# Patient Record
(demographics unavailable — no encounter records)

---

## 2024-11-25 NOTE — HISTORY OF PRESENT ILLNESS
[Menopause Age: ____] : age at menopause was [unfilled] [TextBox_4] :  She is .  Menopausal. No children d/t infertility. Her  is disabled- Meniere's disease. He is 9 years older than her. Feels well. No gyn complaints. Normal bladder & bowel function. Pt;s life is stressful - she has been her parents caretaker / nurse. M- advance Alzheimers / bedridden.  F  of CHF in 2024 Laparoscopic RSO - for a benign cyst.  has depression and can be verbally abusive.  Patient worked at [employer]. but went on disability for anxiety/stress. Pt has been caring for both of her parents - M has dementia; F - severe degenerative joint disease; CHF. Pt's sister is in [facility] hospital incapable of caring for herself. On meds for anxiety/ depression- taking Citalopram and Xanax; manages with her aretha and therapist/ psychiatrist. Stopped working in .  -hysteroscopy. Hx of IBS - PCP; no GI MD. Normal bladder function. .  2017-right salpingo-oophorectomy. Pt had had Covid vaccine incl the bivalent booster. Also had Covid in early 10/8/22. [Mammogramdate] : 10/25/2024 [TextBox_19] : Grant lifetime risk: 11.7%.  Scattered fibroglandular density with no evidence of malignancy.  Breast arterial calcification: Grade 0.  10/25/2024 [BreastSonogramDate] : 10/25/2024 [TextBox_25] : Negative [PapSmeardate] : 11/21/2023 [TextBox_31] : neg; HPV neg [TextBox_43] : Cologuanam neg in 2023 - thru PCP

## 2024-11-25 NOTE — PHYSICAL EXAM
[Chaperone Present] : A chaperone was present in the examining room during all aspects of the physical examination [No Lymphadenopathy] : no lymphadenopathy [Soft] : soft [Non-tender] : non-tender [No HSM] : No HSM [No Mass] : no mass [Examination Of The Breasts] : a normal appearance [No Masses] : no breast masses were palpable [Vulvar Atrophy] : vulvar atrophy [Labia Majora] : normal [Labia Minora] : normal [Atrophy] : atrophy [Normal] : normal [Anteversion] : anteverted [Uterine Adnexae] : normal [Nl Sphincter Tone] : normal sphincter tone [99262] : A chaperone was present during the pelvic exam. [FreeTextEntry2] : Ann Encarnacion [FreeTextEntry3] : No thyroid nodules [FreeTextEntry7] : surg scars [Enlarged ___ wks] : not enlarged [FreeTextEntry9] : No masses

## 2024-11-25 NOTE — PHYSICAL EXAM
[Chaperone Present] : A chaperone was present in the examining room during all aspects of the physical examination [No Lymphadenopathy] : no lymphadenopathy [Soft] : soft [Non-tender] : non-tender [No HSM] : No HSM [No Mass] : no mass [Examination Of The Breasts] : a normal appearance [No Masses] : no breast masses were palpable [Vulvar Atrophy] : vulvar atrophy [Labia Majora] : normal [Labia Minora] : normal [Atrophy] : atrophy [Normal] : normal [Anteversion] : anteverted [Uterine Adnexae] : normal [Nl Sphincter Tone] : normal sphincter tone [[id number]] : A chaperone was present during the pelvic exam. [FreeTextEntry2] : Ann Encarnacion [FreeTextEntry3] : No thyroid nodules [FreeTextEntry7] : surg scars [Enlarged ___ wks] : not enlarged [FreeTextEntry9] : No masses

## 2024-11-25 NOTE — HISTORY OF PRESENT ILLNESS
[Menopause Age: ____] : age at menopause was [unfilled] [TextBox_4] :  She is .  Menopausal. No children d/t infertility. Her  is disabled- Meniere's disease. He is 9 years older than her. Feels well. No gyn complaints. Normal bladder & bowel function. Pt;s life is stressful - she has been her parents caretaker / nurse. M- advance Alzheimers / bedridden.  F  of CHF in 2024 Laparoscopic RSO - for a benign cyst.  has depression and can be verbally abusive.  Patient worked at [employer]. but went on disability for anxiety/stress. Pt has been caring for both of her parents - M has dementia; F - severe degenerative joint disease; CHF. Pt's sister is in [facility] hospital incapable of caring for herself. On meds for anxiety/ depression- taking Citalopram and Xanax; manages with her aretha and therapist/ psychiatrist. Stopped working in .  -hysteroscopy. Hx of IBS - PCP; no GI MD. Normal bladder function. .  2017-right salpingo-oophorectomy. Pt had had Covid vaccine incl the bivalent booster. Also had Covid in early 10/8/22. [Mammogramdate] : 10/25/2024 [TextBox_19] : Grant lifetime risk: 11.7%.  Scattered fibroglandular density with no evidence of malignancy.  Breast arterial calcification: Grade 0.  10/25/2024 [BreastSonogramDate] : 10/25/2024 [TextBox_25] : Negative [PapSmeardate] : 11/21/2023 [TextBox_31] : neg; HPV neg [TextBox_43] : Cologuanam neg in 2023 - thru PCP